# Patient Record
Sex: FEMALE | Race: BLACK OR AFRICAN AMERICAN | URBAN - METROPOLITAN AREA
[De-identification: names, ages, dates, MRNs, and addresses within clinical notes are randomized per-mention and may not be internally consistent; named-entity substitution may affect disease eponyms.]

---

## 2022-03-19 PROBLEM — K62.89 RECTAL MASS: Status: ACTIVE | Noted: 2021-03-01

## 2022-03-19 PROBLEM — K62.5 RECTAL BLEEDING: Status: ACTIVE | Noted: 2021-03-28

## 2024-02-28 ENCOUNTER — OFFICE VISIT (OUTPATIENT)
Dept: ORTHOPEDIC SURGERY | Age: 39
End: 2024-02-28
Payer: COMMERCIAL

## 2024-02-28 VITALS — WEIGHT: 127 LBS | HEIGHT: 68 IN | BODY MASS INDEX: 19.25 KG/M2

## 2024-02-28 DIAGNOSIS — M50.30 DDD (DEGENERATIVE DISC DISEASE), CERVICAL: ICD-10-CM

## 2024-02-28 DIAGNOSIS — M54.6 CHRONIC THORACIC SPINE PAIN: Primary | ICD-10-CM

## 2024-02-28 DIAGNOSIS — G89.29 CHRONIC THORACIC SPINE PAIN: Primary | ICD-10-CM

## 2024-02-28 DIAGNOSIS — M54.12 CERVICAL RADICULITIS: ICD-10-CM

## 2024-02-28 PROCEDURE — 1036F TOBACCO NON-USER: CPT | Performed by: PHYSICIAN ASSISTANT

## 2024-02-28 PROCEDURE — G8484 FLU IMMUNIZE NO ADMIN: HCPCS | Performed by: PHYSICIAN ASSISTANT

## 2024-02-28 PROCEDURE — G8427 DOCREV CUR MEDS BY ELIG CLIN: HCPCS | Performed by: PHYSICIAN ASSISTANT

## 2024-02-28 PROCEDURE — G8420 CALC BMI NORM PARAMETERS: HCPCS | Performed by: PHYSICIAN ASSISTANT

## 2024-02-28 PROCEDURE — 99204 OFFICE O/P NEW MOD 45 MIN: CPT | Performed by: PHYSICIAN ASSISTANT

## 2024-02-28 RX ORDER — MELOXICAM 15 MG/1
15 TABLET ORAL DAILY PRN
Qty: 30 TABLET | Refills: 0 | Status: SHIPPED | OUTPATIENT
Start: 2024-02-28 | End: 2024-03-29

## 2024-02-28 RX ORDER — DEXTROAMPHETAMINE SACCHARATE, AMPHETAMINE ASPARTATE, DEXTROAMPHETAMINE SULFATE AND AMPHETAMINE SULFATE 5; 5; 5; 5 MG/1; MG/1; MG/1; MG/1
0.5 TABLET ORAL 2 TIMES DAILY
COMMUNITY

## 2024-02-28 NOTE — PROGRESS NOTES
F/u to review MRIs           Susan Justice PA-C, MPAS  Board Certified by the [facility]

## 2024-02-29 ENCOUNTER — TELEPHONE (OUTPATIENT)
Dept: ORTHOPEDIC SURGERY | Age: 39
End: 2024-02-29

## 2024-02-29 NOTE — TELEPHONE ENCOUNTER
Left a voicemail for Es Small regarding MRI CERVICAL & THORACIC WO CONTRAST approval and authorization being valid until 2/27/2025.  Patient was instructed that their MRI needs to be scheduled at [facility] . The patient was instructed to contact the facility to schedule  at [phone].    A follow up appointment will need to be scheduled to review the results and treatment plan.     The patient was provided contact information should the need arise to reschedule any of the appointments.

## 2024-03-06 ENCOUNTER — TELEPHONE (OUTPATIENT)
Dept: ORTHOPEDIC SURGERY | Age: 39
End: 2024-03-06

## 2024-03-06 NOTE — TELEPHONE ENCOUNTER
L/M for the patient informing her we received her MRI results and wanted to schedule patient for a follow-up appointment, mainline was provided to the patient to call & schedule.     MRI results will not be given over the phone or via [employer]. Results must be given in office.

## 2024-03-08 ENCOUNTER — HOSPITAL ENCOUNTER (OUTPATIENT)
Dept: PHYSICAL THERAPY | Age: 39
Setting detail: THERAPIES SERIES
Discharge: HOME | End: 2024-03-08

## 2024-03-08 DIAGNOSIS — M54.6 PAIN IN THORACIC SPINE: Primary | ICD-10-CM

## 2024-03-08 DIAGNOSIS — M50.30 DEGENERATION OF CERVICAL INTERVERTEBRAL DISC: ICD-10-CM

## 2024-03-08 DIAGNOSIS — M54.12 CERVICAL RADICULITIS: ICD-10-CM

## 2024-03-13 ENCOUNTER — OFFICE VISIT (OUTPATIENT)
Dept: ORTHOPEDIC SURGERY | Age: 39
End: 2024-03-13
Payer: COMMERCIAL

## 2024-03-13 VITALS — HEIGHT: 68 IN | WEIGHT: 127 LBS | BODY MASS INDEX: 19.25 KG/M2

## 2024-03-13 DIAGNOSIS — S29.019D THORACIC MYOFASCIAL STRAIN, SUBSEQUENT ENCOUNTER: ICD-10-CM

## 2024-03-13 DIAGNOSIS — M54.6 CHRONIC THORACIC SPINE PAIN: ICD-10-CM

## 2024-03-13 DIAGNOSIS — R20.2 NUMBNESS AND TINGLING IN LEFT HAND: ICD-10-CM

## 2024-03-13 DIAGNOSIS — M79.18 CERVICAL MYOFASCIAL PAIN SYNDROME: ICD-10-CM

## 2024-03-13 DIAGNOSIS — M54.12 CERVICAL RADICULITIS: ICD-10-CM

## 2024-03-13 DIAGNOSIS — R20.0 NUMBNESS AND TINGLING IN LEFT HAND: ICD-10-CM

## 2024-03-13 DIAGNOSIS — Z76.89 ENCOUNTER TO ESTABLISH CARE WITH NEW DOCTOR: Primary | ICD-10-CM

## 2024-03-13 DIAGNOSIS — G89.29 CHRONIC THORACIC SPINE PAIN: ICD-10-CM

## 2024-03-13 PROCEDURE — 99214 OFFICE O/P EST MOD 30 MIN: CPT | Performed by: PHYSICIAN ASSISTANT

## 2024-03-13 PROCEDURE — G8484 FLU IMMUNIZE NO ADMIN: HCPCS | Performed by: PHYSICIAN ASSISTANT

## 2024-03-13 PROCEDURE — G8427 DOCREV CUR MEDS BY ELIG CLIN: HCPCS | Performed by: PHYSICIAN ASSISTANT

## 2024-03-13 PROCEDURE — G8420 CALC BMI NORM PARAMETERS: HCPCS | Performed by: PHYSICIAN ASSISTANT

## 2024-03-13 PROCEDURE — 1036F TOBACCO NON-USER: CPT | Performed by: PHYSICIAN ASSISTANT
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
## 2024-03-20 ENCOUNTER — TELEPHONE (OUTPATIENT)
Dept: ORTHOPEDIC SURGERY | Age: 39
End: 2024-03-20

## 2024-03-20 ENCOUNTER — OFFICE VISIT (OUTPATIENT)
Dept: ORTHOPEDIC SURGERY | Age: 39
End: 2024-03-20
Payer: COMMERCIAL

## 2024-03-20 VITALS — HEIGHT: 68 IN | BODY MASS INDEX: 19.25 KG/M2 | WEIGHT: 127 LBS

## 2024-03-20 DIAGNOSIS — M54.12 CERVICAL RADICULITIS: ICD-10-CM

## 2024-03-20 DIAGNOSIS — M79.18 CERVICAL MYOFASCIAL PAIN SYNDROME: ICD-10-CM

## 2024-03-20 DIAGNOSIS — G89.29 CHRONIC THORACIC SPINE PAIN: Primary | ICD-10-CM

## 2024-03-20 DIAGNOSIS — M54.6 CHRONIC THORACIC SPINE PAIN: Primary | ICD-10-CM

## 2024-03-20 PROCEDURE — 99214 OFFICE O/P EST MOD 30 MIN: CPT | Performed by: PHYSICIAN ASSISTANT

## 2024-03-20 PROCEDURE — G8420 CALC BMI NORM PARAMETERS: HCPCS | Performed by: PHYSICIAN ASSISTANT

## 2024-03-20 PROCEDURE — 1036F TOBACCO NON-USER: CPT | Performed by: PHYSICIAN ASSISTANT

## 2024-03-20 PROCEDURE — G8427 DOCREV CUR MEDS BY ELIG CLIN: HCPCS | Performed by: PHYSICIAN ASSISTANT

## 2024-03-20 PROCEDURE — G8484 FLU IMMUNIZE NO ADMIN: HCPCS | Performed by: PHYSICIAN ASSISTANT
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
## 2024-03-26 DIAGNOSIS — M54.12 CERVICAL RADICULITIS: ICD-10-CM

## 2024-03-26 DIAGNOSIS — M54.6 CHRONIC THORACIC SPINE PAIN: ICD-10-CM

## 2024-03-26 DIAGNOSIS — G89.29 CHRONIC THORACIC SPINE PAIN: ICD-10-CM

## 2024-03-26 DIAGNOSIS — M50.30 DDD (DEGENERATIVE DISC DISEASE), CERVICAL: ICD-10-CM

## 2024-03-26 RX ORDER — MELOXICAM 15 MG/1
15 TABLET ORAL DAILY PRN
Qty: 30 TABLET | Refills: 0 | Status: SHIPPED | OUTPATIENT
Start: 2024-03-26
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
## 2024-05-02 DIAGNOSIS — M54.12 CERVICAL RADICULITIS: ICD-10-CM

## 2024-05-02 DIAGNOSIS — M50.30 DDD (DEGENERATIVE DISC DISEASE), CERVICAL: ICD-10-CM

## 2024-05-02 DIAGNOSIS — G89.29 CHRONIC THORACIC SPINE PAIN: ICD-10-CM

## 2024-05-02 DIAGNOSIS — M54.6 CHRONIC THORACIC SPINE PAIN: ICD-10-CM

## 2024-05-02 RX ORDER — MELOXICAM 15 MG/1
15 TABLET ORAL DAILY PRN
Qty: 30 TABLET | Refills: 0 | Status: SHIPPED | OUTPATIENT
Start: 2024-05-02
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
## 2024-06-07 DIAGNOSIS — M54.6 CHRONIC THORACIC SPINE PAIN: ICD-10-CM

## 2024-06-07 DIAGNOSIS — M50.30 DDD (DEGENERATIVE DISC DISEASE), CERVICAL: ICD-10-CM

## 2024-06-07 DIAGNOSIS — M54.12 CERVICAL RADICULITIS: ICD-10-CM

## 2024-06-07 DIAGNOSIS — G89.29 CHRONIC THORACIC SPINE PAIN: ICD-10-CM
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
## 2024-06-11 RX ORDER — MELOXICAM 15 MG/1
15 TABLET ORAL DAILY PRN
Qty: 30 TABLET | Refills: 0 | Status: SHIPPED | OUTPATIENT
Start: 2024-06-11

## 2024-07-08 DIAGNOSIS — M50.30 DDD (DEGENERATIVE DISC DISEASE), CERVICAL: ICD-10-CM

## 2024-07-08 DIAGNOSIS — M54.6 CHRONIC THORACIC SPINE PAIN: ICD-10-CM

## 2024-07-08 DIAGNOSIS — G89.29 CHRONIC THORACIC SPINE PAIN: ICD-10-CM

## 2024-07-08 DIAGNOSIS — M54.12 CERVICAL RADICULITIS: ICD-10-CM

## 2024-07-29 RX ORDER — MELOXICAM 15 MG/1
15 TABLET ORAL DAILY PRN
Qty: 30 TABLET | Refills: 0 | OUTPATIENT
Start: 2024-07-29
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
## 2024-07-31 DIAGNOSIS — M54.12 CERVICAL RADICULITIS: ICD-10-CM

## 2024-07-31 DIAGNOSIS — M54.6 CHRONIC THORACIC SPINE PAIN: ICD-10-CM

## 2024-07-31 DIAGNOSIS — M50.30 DDD (DEGENERATIVE DISC DISEASE), CERVICAL: ICD-10-CM

## 2024-07-31 DIAGNOSIS — G89.29 CHRONIC THORACIC SPINE PAIN: ICD-10-CM

## 2024-08-05 RX ORDER — MELOXICAM 15 MG/1
15 TABLET ORAL DAILY PRN
Qty: 30 TABLET | Refills: 0 | Status: SHIPPED | OUTPATIENT
Start: 2024-08-05
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
## 2024-09-09 DIAGNOSIS — M54.12 CERVICAL RADICULITIS: ICD-10-CM

## 2024-09-09 DIAGNOSIS — M50.30 DDD (DEGENERATIVE DISC DISEASE), CERVICAL: ICD-10-CM

## 2024-09-09 DIAGNOSIS — M54.6 CHRONIC THORACIC SPINE PAIN: ICD-10-CM

## 2024-09-09 DIAGNOSIS — G89.29 CHRONIC THORACIC SPINE PAIN: ICD-10-CM
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
## 2024-09-10 RX ORDER — MELOXICAM 15 MG/1
15 TABLET ORAL DAILY PRN
Qty: 30 TABLET | Refills: 0 | OUTPATIENT
Start: 2024-09-10

## 2024-09-14 DIAGNOSIS — M50.30 DDD (DEGENERATIVE DISC DISEASE), CERVICAL: ICD-10-CM

## 2024-09-14 DIAGNOSIS — M54.6 CHRONIC THORACIC SPINE PAIN: ICD-10-CM

## 2024-09-14 DIAGNOSIS — G89.29 CHRONIC THORACIC SPINE PAIN: ICD-10-CM

## 2024-09-14 DIAGNOSIS — M54.12 CERVICAL RADICULITIS: ICD-10-CM

## 2024-09-16 RX ORDER — MELOXICAM 15 MG/1
15 TABLET ORAL DAILY PRN
Qty: 30 TABLET | Refills: 0 | OUTPATIENT
Start: 2024-09-16

## 2024-11-18 ENCOUNTER — OFFICE VISIT (OUTPATIENT)
Dept: ORTHOPEDIC SURGERY | Age: 39
End: 2024-11-18
Payer: COMMERCIAL

## 2024-11-18 VITALS — HEIGHT: 68 IN | WEIGHT: 127 LBS | BODY MASS INDEX: 19.25 KG/M2

## 2024-11-18 DIAGNOSIS — M25.311 DYSKINESIS OF RIGHT SCAPULA: ICD-10-CM

## 2024-11-18 DIAGNOSIS — M25.511 RIGHT SHOULDER PAIN, UNSPECIFIED CHRONICITY: Primary | ICD-10-CM

## 2024-11-18 DIAGNOSIS — M25.511 PERISCAPULAR PAIN OF RIGHT SHOULDER: ICD-10-CM

## 2024-11-18 PROCEDURE — 99203 OFFICE O/P NEW LOW 30 MIN: CPT | Performed by: PHYSICIAN ASSISTANT

## 2024-11-18 RX ORDER — GABAPENTIN 300 MG/1
300 CAPSULE ORAL 3 TIMES DAILY
COMMUNITY
Start: 2024-11-14
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
## 2025-01-06 NOTE — PROGRESS NOTES
PATIENT REACHED   YES____NO____    PREOP INSTRUCTIONS LEFT ON  [phone]______________      DATE____01/09/25_____ TIME__1215_______ARRIVAL__1115______PLACE_[address] [place] [address]____________  NOTHING TO EAT OR DRINK  AFTER MIDNIGHT THE EVENING PRIOR OR AS INSTRUCTED BY YOUR DR.  YOU NEED A RESPONSIBLE ADULT AGE 18 OR OLDER TO DRIVE YOU HOME  PLEASE BRING INSURANCE CARD.PICTURE ID AND COMPLETE LIST OF MEDS  WEAR LOOSE COMFORTABLE CLOTHING  FOLLOW ANY INSTRUCTIONS YOUR DRS OFFICE HAS GIVEN YOU,INCLUDING WHAT MEDICATIONS TO TAKE THE AM OF PROCEDURE AND WHEN AND IF YOU NEED TO STOP ANY BLOOD THINNERS. IF YOU HAVE QUESTIONS REGARDING THIS CALL THE OFFICE  THE GOAL BLOOD SUGAR THE AM OF PROCEDURE  OR LESS ABOVE THAT THE PROCEDURE MAY BE CANCELLED  ANY QUESTIONS CALL YOUR DOCTOR.ALSO,PLEASE READ THE INSTRUCTION PACKET FROM YOUR DR IF YOU RECEIVED ONE.  SPINE INTERVENTION NUMBER [phone]      OTHER___________________________________      VISITOR POLICY(subject to change)    Current policy is 2 visitors per patient. No children. Masks are required.

## 2025-01-09 ENCOUNTER — APPOINTMENT (OUTPATIENT)
Dept: GENERAL RADIOLOGY | Age: 40
End: 2025-01-09
Attending: PHYSICAL MEDICINE & REHABILITATION
Payer: COMMERCIAL

## 2025-01-09 ENCOUNTER — HOSPITAL ENCOUNTER (OUTPATIENT)
Age: 40
Setting detail: OUTPATIENT SURGERY
Discharge: HOME | End: 2025-01-09
Attending: PHYSICAL MEDICINE & REHABILITATION | Admitting: PHYSICAL MEDICINE & REHABILITATION
Payer: COMMERCIAL

## 2025-01-09 VITALS
DIASTOLIC BLOOD PRESSURE: 94 MMHG | OXYGEN SATURATION: 100 % | HEIGHT: 68 IN | HEART RATE: 63 BPM | RESPIRATION RATE: 16 BRPM | BODY MASS INDEX: 20.16 KG/M2 | TEMPERATURE: 97.8 F | WEIGHT: 133 LBS | SYSTOLIC BLOOD PRESSURE: 136 MMHG

## 2025-01-09 LAB — HCG UR QL: NEGATIVE

## 2025-01-09 PROCEDURE — 77003 FLUOROGUIDE FOR SPINE INJECT: CPT

## 2025-01-09 PROCEDURE — 6360000002 HC RX W HCPCS: Performed by: PHYSICAL MEDICINE & REHABILITATION

## 2025-01-09 PROCEDURE — 3610000058 HC PAIN LEVEL 5 BASE (NON-OR): Performed by: PHYSICAL MEDICINE & REHABILITATION

## 2025-01-09 PROCEDURE — 99153 MOD SED SAME PHYS/QHP EA: CPT | Performed by: PHYSICAL MEDICINE & REHABILITATION

## 2025-01-09 PROCEDURE — 3610000059 HC PAIN LEVEL 5 ADDL 15 MIN (NON-OR): Performed by: PHYSICAL MEDICINE & REHABILITATION

## 2025-01-09 PROCEDURE — 99152 MOD SED SAME PHYS/QHP 5/>YRS: CPT | Performed by: PHYSICAL MEDICINE & REHABILITATION

## 2025-01-09 PROCEDURE — 84703 CHORIONIC GONADOTROPIN ASSAY: CPT

## 2025-01-09 PROCEDURE — 2709999900 HC NON-CHARGEABLE SUPPLY: Performed by: PHYSICAL MEDICINE & REHABILITATION

## 2025-01-09 RX ORDER — FENTANYL CITRATE 50 UG/ML
INJECTION, SOLUTION INTRAMUSCULAR; INTRAVENOUS
Status: COMPLETED | OUTPATIENT
Start: 2025-01-09 | End: 2025-01-09

## 2025-01-09 RX ORDER — LIDOCAINE HYDROCHLORIDE 10 MG/ML
INJECTION, SOLUTION EPIDURAL; INFILTRATION; INTRACAUDAL; PERINEURAL
Status: COMPLETED | OUTPATIENT
Start: 2025-01-09 | End: 2025-01-09

## 2025-01-09 RX ORDER — LIDOCAINE HYDROCHLORIDE 20 MG/ML
INJECTION, SOLUTION INFILTRATION; PERINEURAL
Status: COMPLETED | OUTPATIENT
Start: 2025-01-09 | End: 2025-01-09

## 2025-01-09 RX ORDER — BUPIVACAINE HYDROCHLORIDE 5 MG/ML
INJECTION, SOLUTION EPIDURAL; INTRACAUDAL
Status: COMPLETED | OUTPATIENT
Start: 2025-01-09 | End: 2025-01-09

## 2025-01-09 RX ORDER — IBUPROFEN 200 MG
800 TABLET ORAL EVERY 6 HOURS PRN
COMMUNITY

## 2025-01-09 RX ORDER — MIDAZOLAM HYDROCHLORIDE 1 MG/ML
INJECTION, SOLUTION INTRAMUSCULAR; INTRAVENOUS
Status: COMPLETED | OUTPATIENT
Start: 2025-01-09 | End: 2025-01-09

## 2025-01-09 RX ORDER — DEXAMETHASONE SODIUM PHOSPHATE 10 MG/ML
INJECTION INTRAMUSCULAR; INTRAVENOUS
Status: COMPLETED | OUTPATIENT
Start: 2025-01-09 | End: 2025-01-09

## 2025-01-09 ASSESSMENT — PAIN - FUNCTIONAL ASSESSMENT
PAIN_FUNCTIONAL_ASSESSMENT: 0-10
PAIN_FUNCTIONAL_ASSESSMENT: PREVENTS OR INTERFERES SOME ACTIVE ACTIVITIES AND ADLS
PAIN_FUNCTIONAL_ASSESSMENT: PREVENTS OR INTERFERES SOME ACTIVE ACTIVITIES AND ADLS
PAIN_FUNCTIONAL_ASSESSMENT: 0-10
PAIN_FUNCTIONAL_ASSESSMENT: 0-10

## 2025-01-09 ASSESSMENT — PAIN DESCRIPTION - DESCRIPTORS
DESCRIPTORS: ACHING
DESCRIPTORS: BURNING;STABBING;SHARP
DESCRIPTORS: DISCOMFORT

## 2025-01-09 NOTE — OP NOTE
PATIENT:  Es Small  AGE:  39 yrs  MEDICAL RECORD #:  [id number]  YOB: 1985     DATE:  1/9/2025  PHYSICIAN: Kobe Rivera M.D.     PROCEDURE: Bilateral C4,5,6 radiofrequency ablation/neurotomy under fluoroscopy.     PRE-OP DIAGNOSIS:  Neck Pain/ Cervical Spondylosis     POST-OP DIAGNOSIS:  same     HISTORY OF PRESENT ILLNESS:  See office notes. Patient has failed previous less-invasive treatments. Appropriate response to previous medial branch blocks at the same anatomic locations.     ALLERGIES:  Avocado, Banana, Beef-derived drug products, Other, Rice, and Strawberry     MEDICATIONS:    No current facility-administered medications for this encounter.        PHYSICAL EXAMINATION:              General:  Awake, alert              Heart:  No audible murmurs, extremities well perfused              Lungs:  No increased WOB or audible wheezing              Extremities:  Normal tone. Warm. No swelling.      Anesthesia: 0.5 mcg Versed 25 mg Fentanyl    Estimated blood loss: None  Complications: None  Specimen: None    DESCRIPTION OF PROCEDURE:     Components of the procedure were again reviewed with the patient prior to the procedure.  She is aware of risks including infection, bleeding, allergic reaction, and nerve injury.  She had ample opportunity for additional questions.  She elected to proceed with treatment.     The patient was placed in the prone position.  Utilizing fluoroscopy, the bilateral C4,5,6 areas were identified, target points for the accompanying medial branch nerves were identified. Appropriate entry sites were selected over the skin.  The skin was prepared in an aseptic fashion.  Local anesthesia was carried out at each of the  6  entry sites with 1-2 ml lidocaine 1%.     Under fluoroscopic guidance (AP and oblique views) a curved 20 gauge 10 cm RFA spinal needle was advanced to the bilateral C4,5,6 medial branches (at the lateral aspect of the articular process).  The RFA probe

## 2025-01-09 NOTE — H&P
HISTORY AND PHYSICAL/PRE-SEDATION ASSESSMENT    Patient:  Es Small   :  1985  Medical Record No.:  [id number]   Date:  2025  Physician:  Kobe Rivera MD  Facility: [facility]    HISTORY OF PRESENT ILLNESS:                 The patient is a 39 y.o. female whom presents with neck pain. Review of the imaging and physical exam of the patient confirmed the pre-procedure diagnosis.  After a thorough discussion of risks, benefits and alternatives informed consent was obtained.    Diagnosis:  Pre-Op Diagnosis Codes:      * Cervical spondylosis [M47.812]    Past Medical History:   History reviewed. No pertinent past medical history.     Past Surgical History:     Past Surgical History:   Procedure Laterality Date    HEMORRHOID SURGERY      UMBILICAL HERNIA REPAIR         Current Medications:   Prior to Admission medications    Medication Sig Start Date End Date Taking? Authorizing Provider   ibuprofen (ADVIL;MOTRIN) 200 MG tablet Take 4 tablets by mouth every 6 hours as needed for Pain   Yes Jimena Royal MD   gabapentin (NEURONTIN) 300 MG capsule Take 1 capsule by mouth 3 times daily. 24  Yes Jimena Royal MD   meloxicam (MOBIC) 15 MG tablet TAKE 1 TABLET BY MOUTH EVERY DAY AS NEEDED FOR PAIN 24  Yes Susan Justice PA-C   amphetamine-dextroamphetamine (ADDERALL) 20 MG tablet Take 0.5 tablets by mouth 2 times daily.   Yes ProviderJimena MD       Allergies:  Avocado, Banana, Beef-derived drug products, Other, Rice, and [place]    Social History:    reports that she has never smoked. She has never used smokeless tobacco. She reports current alcohol use.    Family History:   History reviewed. No pertinent family history.     Vitals: Blood pressure 120/86, pulse 73, temperature 97.8 °F (36.6 °C), temperature source Temporal, resp. rate 16, height 1.727 m (5' 8\"), weight 60.3 kg (133 lb), SpO2 100%.    PHYSICAL EXAM:  HENT:

## 2025-01-09 NOTE — PROGRESS NOTES
___BILATERAL C4, C5, C6 (FACET LEVELS C4-5, C5-6) RADIOFREQUENCY ABLATION WITH FLUOROSCOPY - [place] - Bilateral  site (marking) observed and reported at handoff

## (undated) DEVICE — GLOVE ORANGE PI 8   MSG9080

## (undated) DEVICE — SYRINGE, LUER LOCK, 10ML: Brand: MEDLINE

## (undated) DEVICE — APPLICATOR MEDICATED 26 CC SOLUTION HI LT ORNG CHLORAPREP

## (undated) DEVICE — GLOVE ORANGE PI 7   MSG9070

## (undated) DEVICE — SHEET,DRAPE,53X77,STERILE: Brand: MEDLINE

## (undated) DEVICE — TOWEL,OR,DSP,ST,BLUE,STD,4/PK,20PK/CS: Brand: MEDLINE

## (undated) DEVICE — PEN: MARKING STD 100/CS: Brand: MEDICAL ACTION INDUSTRIES

## (undated) DEVICE — PAD GRND NEUT ELECTRD AD DISP